# Patient Record
Sex: MALE | Race: WHITE | NOT HISPANIC OR LATINO | URBAN - METROPOLITAN AREA
[De-identification: names, ages, dates, MRNs, and addresses within clinical notes are randomized per-mention and may not be internally consistent; named-entity substitution may affect disease eponyms.]

---

## 2018-10-03 ENCOUNTER — EMERGENCY (EMERGENCY)
Facility: HOSPITAL | Age: 68
LOS: 1 days | Discharge: HOME | End: 2018-10-03
Attending: EMERGENCY MEDICINE | Admitting: EMERGENCY MEDICINE
Payer: COMMERCIAL

## 2018-10-03 VITALS
HEART RATE: 75 BPM | TEMPERATURE: 98 F | OXYGEN SATURATION: 97 % | RESPIRATION RATE: 16 BRPM | SYSTOLIC BLOOD PRESSURE: 121 MMHG | DIASTOLIC BLOOD PRESSURE: 79 MMHG

## 2018-10-03 LAB
APTT BLD: 42.4 SEC — HIGH (ref 27.5–37.4)
INR BLD: 3.11 — HIGH (ref 0.88–1.17)
PROTHROM AB SERPL-ACNC: 36.6 SEC — HIGH (ref 9.8–13.1)

## 2018-10-03 PROCEDURE — 70450 CT HEAD/BRAIN W/O DYE: CPT | Mod: 26

## 2018-10-03 PROCEDURE — 99282 EMERGENCY DEPT VISIT SF MDM: CPT | Mod: GW

## 2018-10-03 PROCEDURE — 99284 EMERGENCY DEPT VISIT MOD MDM: CPT

## 2018-10-03 NOTE — ED ADULT NURSE NOTE - OBJECTIVE STATEMENT
Patient was hit on the right side of his head witha golf ball and has sustained a laceration and is on Coumadin. Pt to room 24.Pt evaluated by MD. PT/INR drawn and sent. Pt to CT Scan of head and waiting for results, further orders and disposition.  MARIA FERNANDA Giles

## 2018-10-03 NOTE — ED PROVIDER NOTE - ATTENDING CONTRIBUTION TO CARE
I performed a face to face bedside interview with patient regarding history of present illness, review of symptoms and past medical history. I completed an independent physical exam.  I have discussed patient's plan of care.   I agree with note as stated above, having amended the EMR as needed to reflect my findings. I have discussed the assessment and plan of care.  This includes during the time I functioned as the attending physician for this patient.  Attending Contribution to Care: agree with plan of PA. pt p/w head trauma from golf ball. pt head ct neg. coumadin mildly elevated to 3.11. will skip todays dose.

## 2018-10-03 NOTE — ED ADULT TRIAGE NOTE - CHIEF COMPLAINT QUOTE
Pt. states he was hit with golf ball to right side of head this morning. Laceration to right side of head- no active bleeding at this time. c/o ringing to right ear. Pt. on coumadin for afib. PMHx: HLD, HLD, DM fs =151 Pt. states he was hit with golf ball to right side of head this morning. Laceration to right side of head- no active bleeding at this time. c/o ringing to right ear and lightheadedness. Denies blurry vision or photophobia. Pt. on coumadin for afib. PMHx: HLD, HLD, DM fs =151

## 2018-10-03 NOTE — ED PROVIDER NOTE - OBJECTIVE STATEMENT
68 y/o male pmh afib on coumadin c/o head injury x today. pt admits to working [place] the golf [place] and was walking down the fairway when a ball struck his R head. Pt admits to bleeding from wound 66 y/o male pmh afib on coumadin c/o head injury x today. pt admits to working [place] the golf [place] and was walking down the fairway when a ball struck his R head. Pt admits to bleeding from wound and mild pain to area. Pt denies LOC.  Denies chest pain, sob, abd pain, n/v/d, numbness, tingling, weakness, dizziness, fever or chills

## 2022-12-18 ENCOUNTER — OFFICE (OUTPATIENT)
Dept: URBAN - METROPOLITAN AREA CLINIC 12 | Facility: CLINIC | Age: 72
Setting detail: OPHTHALMOLOGY
End: 2022-12-18
Payer: MEDICARE

## 2022-12-18 DIAGNOSIS — D48.7: ICD-10-CM

## 2022-12-18 DIAGNOSIS — H43.812: ICD-10-CM

## 2022-12-18 DIAGNOSIS — E11.9: ICD-10-CM

## 2022-12-18 DIAGNOSIS — H43.393: ICD-10-CM

## 2022-12-18 DIAGNOSIS — E11.3293: ICD-10-CM

## 2022-12-18 DIAGNOSIS — H35.3131: ICD-10-CM

## 2022-12-18 DIAGNOSIS — H26.493: ICD-10-CM

## 2022-12-18 DIAGNOSIS — Z96.1: ICD-10-CM

## 2022-12-18 PROCEDURE — 92014 COMPRE OPH EXAM EST PT 1/>: CPT | Performed by: OPTOMETRIST

## 2022-12-18 PROCEDURE — 92250 FUNDUS PHOTOGRAPHY W/I&R: CPT | Performed by: OPTOMETRIST

## 2022-12-18 ASSESSMENT — KERATOMETRY
OS_K1POWER_DIOPTERS: 42.25
OD_K1POWER_DIOPTERS: 42.25
OS_AXISANGLE_DEGREES: 059
OD_AXISANGLE_DEGREES: 103
OD_K2POWER_DIOPTERS: 43.00
METHOD_AUTO_MANUAL: AUTO
OS_K2POWER_DIOPTERS: 42.75

## 2022-12-18 ASSESSMENT — REFRACTION_AUTOREFRACTION
OS_SPHERE: +1.00
OD_CYLINDER: -0.50
OS_AXIS: 125
OS_CYLINDER: -0.75
OD_SPHERE: +0.25
OD_AXIS: 011

## 2022-12-18 ASSESSMENT — AXIALLENGTH_DERIVED
OS_AL: 23.7164
OD_AL: 23.8678
OD_AL: 23.9177
OS_AL: 23.7164

## 2022-12-18 ASSESSMENT — REFRACTION_CURRENTRX
OS_OVR_VA: 20/
OD_VPRISM_DIRECTION: SV
OD_SPHERE: +3.50
OD_OVR_VA: 20/
OS_VPRISM_DIRECTION: SV
OS_SPHERE: +3.50

## 2022-12-18 ASSESSMENT — VISUAL ACUITY
OS_BCVA: 20/25-
OD_BCVA: 20/30-1

## 2022-12-18 ASSESSMENT — SPHEQUIV_DERIVED
OS_SPHEQUIV: 0.625
OD_SPHEQUIV: 0.125
OD_SPHEQUIV: 0
OS_SPHEQUIV: 0.625

## 2022-12-18 ASSESSMENT — REFRACTION_MANIFEST
OS_AXIS: 163
OS_SPHERE: +0.75
OS_VA1: 20/30
OD_AXIS: 020
OD_VA1: 20/30
OS_CYLINDER: -0.25
OU_VA: 20/25
OD_CYLINDER: -0.75
OD_SPHERE: +0.50

## 2022-12-18 ASSESSMENT — TONOMETRY
OS_IOP_MMHG: 15
OD_IOP_MMHG: 17

## 2023-01-24 PROBLEM — H16.222 DRY EYE SYNDROME K SICCA; RIGHT EYE, LEFT EYE, BOTH EYES: Status: ACTIVE | Noted: 2023-01-24

## 2023-01-24 PROBLEM — H16.221 DRY EYE SYNDROME K SICCA; RIGHT EYE, LEFT EYE, BOTH EYES: Status: ACTIVE | Noted: 2023-01-24

## 2023-01-24 PROBLEM — H16.223 DRY EYE SYNDROME K SICCA; RIGHT EYE, LEFT EYE, BOTH EYES: Status: ACTIVE | Noted: 2023-01-24

## 2023-06-18 ENCOUNTER — OFFICE (OUTPATIENT)
Dept: URBAN - METROPOLITAN AREA CLINIC 12 | Facility: CLINIC | Age: 73
Setting detail: OPHTHALMOLOGY
End: 2023-06-18
Payer: MEDICARE

## 2023-06-18 DIAGNOSIS — Z96.1: ICD-10-CM

## 2023-06-18 DIAGNOSIS — D48.7: ICD-10-CM

## 2023-06-18 DIAGNOSIS — H35.3131: ICD-10-CM

## 2023-06-18 DIAGNOSIS — H43.393: ICD-10-CM

## 2023-06-18 DIAGNOSIS — H26.493: ICD-10-CM

## 2023-06-18 DIAGNOSIS — H43.812: ICD-10-CM

## 2023-06-18 DIAGNOSIS — E11.9: ICD-10-CM

## 2023-06-18 PROBLEM — H52.7 REFRACTIVE ERROR ; BOTH EYES: Status: ACTIVE | Noted: 2023-06-18

## 2023-06-18 PROCEDURE — 99213 OFFICE O/P EST LOW 20 MIN: CPT | Performed by: OPTOMETRIST

## 2023-06-18 PROCEDURE — 92134 CPTRZ OPH DX IMG PST SGM RTA: CPT | Performed by: OPTOMETRIST

## 2023-06-18 ASSESSMENT — VISUAL ACUITY
OS_BCVA: 20/30
OD_BCVA: 20/30-1

## 2023-06-18 ASSESSMENT — REFRACTION_AUTOREFRACTION
OS_AXIS: 124
OD_CYLINDER: -0.50
OD_AXIS: 020
OS_CYLINDER: -0.75
OS_SPHERE: +1.00
OD_SPHERE: +0.25

## 2023-06-18 ASSESSMENT — REFRACTION_MANIFEST
OD_AXIS: 020
OD_VA1: 20/30
OS_AXIS: 163
OS_SPHERE: +0.75
OS_VA1: 20/30
OU_VA: 20/25
OD_SPHERE: +0.50
OD_CYLINDER: -0.75
OS_CYLINDER: -0.25

## 2023-06-18 ASSESSMENT — REFRACTION_CURRENTRX
OS_VPRISM_DIRECTION: SV
OS_OVR_VA: 20/
OD_OVR_VA: 20/
OD_VPRISM_DIRECTION: SV
OS_SPHERE: +3.50
OD_SPHERE: +3.50

## 2023-06-18 ASSESSMENT — AXIALLENGTH_DERIVED
OS_AL: 23.7164
OD_AL: 23.965
OD_AL: 23.9149
OS_AL: 23.7164

## 2023-06-18 ASSESSMENT — KERATOMETRY
OD_K2POWER_DIOPTERS: 43.25
OS_AXISANGLE_DEGREES: 053
OD_K1POWER_DIOPTERS: 41.75
OD_AXISANGLE_DEGREES: 118
METHOD_AUTO_MANUAL: AUTO
OS_K2POWER_DIOPTERS: 42.75
OS_K1POWER_DIOPTERS: 42.25

## 2023-06-18 ASSESSMENT — TONOMETRY
OD_IOP_MMHG: 15
OS_IOP_MMHG: 14

## 2023-06-18 ASSESSMENT — SPHEQUIV_DERIVED
OD_SPHEQUIV: 0.125
OD_SPHEQUIV: 0
OS_SPHEQUIV: 0.625
OS_SPHEQUIV: 0.625

## 2023-06-18 ASSESSMENT — CONFRONTATIONAL VISUAL FIELD TEST (CVF)
OD_FINDINGS: FULL
OS_FINDINGS: FULL

## 2023-12-17 ENCOUNTER — OFFICE (OUTPATIENT)
Dept: URBAN - METROPOLITAN AREA CLINIC 12 | Facility: CLINIC | Age: 73
Setting detail: OPHTHALMOLOGY
End: 2023-12-17
Payer: MEDICARE

## 2023-12-17 DIAGNOSIS — H35.3131: ICD-10-CM

## 2023-12-17 DIAGNOSIS — H35.033: ICD-10-CM

## 2023-12-17 DIAGNOSIS — E11.9: ICD-10-CM

## 2023-12-17 DIAGNOSIS — H43.393: ICD-10-CM

## 2023-12-17 DIAGNOSIS — H43.812: ICD-10-CM

## 2023-12-17 DIAGNOSIS — H26.493: ICD-10-CM

## 2023-12-17 PROCEDURE — 92250 FUNDUS PHOTOGRAPHY W/I&R: CPT | Performed by: OPTOMETRIST

## 2023-12-17 PROCEDURE — 92014 COMPRE OPH EXAM EST PT 1/>: CPT | Performed by: OPTOMETRIST

## 2023-12-17 ASSESSMENT — REFRACTION_AUTOREFRACTION
OD_SPHERE: +0.25
OS_SPHERE: +1.00
OS_CYLINDER: -0.50
OD_AXIS: 019
OD_CYLINDER: -0.25
OS_AXIS: 116

## 2023-12-17 ASSESSMENT — REFRACTION_MANIFEST
OS_AXIS: 163
OU_VA: 20/25
OD_SPHERE: +0.50
OS_CYLINDER: -0.25
OD_CYLINDER: -0.75
OS_VA1: 20/30
OS_SPHERE: +0.75
OD_VA1: 20/30
OD_AXIS: 020

## 2023-12-17 ASSESSMENT — SPHEQUIV_DERIVED
OD_SPHEQUIV: 0.125
OS_SPHEQUIV: 0.75
OD_SPHEQUIV: 0.125
OS_SPHEQUIV: 0.625

## 2023-12-17 ASSESSMENT — REFRACTION_CURRENTRX
OS_OVR_VA: 20/
OS_SPHERE: +3.50
OD_VPRISM_DIRECTION: SV
OS_VPRISM_DIRECTION: SV
OD_SPHERE: +3.50
OD_OVR_VA: 20/

## 2023-12-17 ASSESSMENT — CONFRONTATIONAL VISUAL FIELD TEST (CVF)
OS_FINDINGS: FULL
OD_FINDINGS: FULL

## 2024-07-06 ENCOUNTER — OFFICE (OUTPATIENT)
Dept: URBAN - METROPOLITAN AREA CLINIC 12 | Facility: CLINIC | Age: 74
Setting detail: OPHTHALMOLOGY
End: 2024-07-06
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
## 2024-07-06 PROCEDURE — NO SHOW FE NO SHOW FEE: Performed by: OPTOMETRIST

## 2024-07-27 ENCOUNTER — OFFICE (OUTPATIENT)
Dept: URBAN - METROPOLITAN AREA CLINIC 12 | Facility: CLINIC | Age: 74
Setting detail: OPHTHALMOLOGY
End: 2024-07-27
Payer: MEDICARE

## 2024-07-27 DIAGNOSIS — H43.393: ICD-10-CM

## 2024-07-27 DIAGNOSIS — H35.033: ICD-10-CM

## 2024-07-27 PROCEDURE — 92014 COMPRE OPH EXAM EST PT 1/>: CPT | Performed by: OPTOMETRIST

## 2024-07-27 PROCEDURE — 92250 FUNDUS PHOTOGRAPHY W/I&R: CPT | Performed by: OPTOMETRIST

## 2024-07-27 ASSESSMENT — CONFRONTATIONAL VISUAL FIELD TEST (CVF)
OD_FINDINGS: FULL
OS_FINDINGS: FULL

## 2024-12-15 ENCOUNTER — OFFICE (OUTPATIENT)
Dept: URBAN - METROPOLITAN AREA CLINIC 12 | Facility: CLINIC | Age: 74
Setting detail: OPHTHALMOLOGY
End: 2024-12-15
Payer: MEDICARE

## 2024-12-15 DIAGNOSIS — H43.393: ICD-10-CM

## 2024-12-15 DIAGNOSIS — H35.033: ICD-10-CM

## 2024-12-15 DIAGNOSIS — H26.493: ICD-10-CM

## 2024-12-15 DIAGNOSIS — H43.812: ICD-10-CM

## 2024-12-15 DIAGNOSIS — D48.7: ICD-10-CM

## 2024-12-15 DIAGNOSIS — E11.9: ICD-10-CM

## 2024-12-15 DIAGNOSIS — H35.3131: ICD-10-CM

## 2024-12-15 PROCEDURE — 99213 OFFICE O/P EST LOW 20 MIN: CPT | Performed by: OPTOMETRIST

## 2024-12-15 PROCEDURE — 92134 CPTRZ OPH DX IMG PST SGM RTA: CPT | Performed by: OPTOMETRIST

## 2024-12-15 ASSESSMENT — REFRACTION_CURRENTRX
OD_VPRISM_DIRECTION: SV
OS_VPRISM_DIRECTION: SV
OD_SPHERE: +3.50
OS_OVR_VA: 20/
OD_OVR_VA: 20/
OS_SPHERE: +3.50

## 2024-12-15 ASSESSMENT — REFRACTION_MANIFEST
OD_VA1: 20/30
OS_SPHERE: +0.75
OS_AXIS: 163
OU_VA: 20/25
OD_AXIS: 020
OD_SPHERE: +0.50
OS_CYLINDER: -0.25
OD_CYLINDER: -0.75
OS_VA1: 20/30

## 2024-12-15 ASSESSMENT — VISUAL ACUITY
OS_BCVA: 20/30-2
OD_BCVA: 20/40-2

## 2024-12-15 ASSESSMENT — KERATOMETRY
OS_K2POWER_DIOPTERS: 42.75
OD_K2POWER_DIOPTERS: 43.25
OS_K1POWER_DIOPTERS: 42.50
OD_K1POWER_DIOPTERS: 42.50
OS_AXISANGLE_DEGREES: 068
OD_AXISANGLE_DEGREES: 100
METHOD_AUTO_MANUAL: AUTO

## 2024-12-15 ASSESSMENT — REFRACTION_AUTOREFRACTION
OD_CYLINDER: -0.50
OS_SPHERE: +0.75
OS_AXIS: 147
OS_CYLINDER: -0.50
OD_SPHERE: +0.50
OD_AXIS: 038

## 2024-12-15 ASSESSMENT — CONFRONTATIONAL VISUAL FIELD TEST (CVF)
OD_FINDINGS: FULL
OS_FINDINGS: FULL